# Patient Record
(demographics unavailable — no encounter records)

---

## 2025-05-31 NOTE — HISTORY OF PRESENT ILLNESS
[de-identified] : Patient is a 16-year-old male accompanied by mother here for evaluation of left thumb injury.  Patient states on 5/29/2025 he was at football practice.  Patient states that his left thumb got caught on another player's jersey and twisted his thumb.  Patient states that he was immediate pain noticed swelling and bruising having difficult time moving the thumb at the MCP joint.  Denies numbness or tingling  Left hand exam: Noted swelling and ecchymosis surrounding the MCPJ of thumb.  Tenderness palpation to base of proximal phalanx thumb, MCPJ and distal aspect of first metacarpal.  Stiffness and discomfort with range of motion at first metacarpal, patient is able to flex, extend, move medially and laterally with discomfort, no gross instability of UCL/RCL.  There is discomfort when stressing the UCL and RCL.  Strength 4-5 throughout with discomfort to the thumb.  Good capillary refill neurovascular intact good sensorimotor function  X-ray left thumb 3 views: No acute fractures, subluxations, dislocations  Discussed in detail with patient and mother that x-rays are negative for acute fracture.  Discussed that there is some suspicion of injury to the RCL/UCL of the MCPJ thumb, due to this concern MRI ordered for further evaluation stat.  Discussed if there is a significant rupture sometimes these do require surgical intervention.  Placed patient in thumb spica brace to wear at all times like a cast, Motrin/T Tylenol or pain, no gym or sports, will follow-up with hand department after MRI.,  Mother will call office after MRI to discuss further treatment plan.  Patient and mother understand agree with plan